# Patient Record
Sex: FEMALE | Race: WHITE | Employment: OTHER
[De-identification: names, ages, dates, MRNs, and addresses within clinical notes are randomized per-mention and may not be internally consistent; named-entity substitution may affect disease eponyms.]

---

## 2017-07-29 ENCOUNTER — HEALTH MAINTENANCE LETTER (OUTPATIENT)
Age: 32
End: 2017-07-29

## 2017-12-20 ENCOUNTER — RESULT FOLLOW UP (OUTPATIENT)
Dept: OBGYN | Facility: CLINIC | Age: 32
End: 2017-12-20

## 2017-12-20 ENCOUNTER — OFFICE VISIT (OUTPATIENT)
Dept: OBGYN | Facility: CLINIC | Age: 32
End: 2017-12-20
Payer: COMMERCIAL

## 2017-12-20 VITALS
DIASTOLIC BLOOD PRESSURE: 63 MMHG | WEIGHT: 136 LBS | SYSTOLIC BLOOD PRESSURE: 99 MMHG | TEMPERATURE: 96.8 F | HEART RATE: 65 BPM | HEIGHT: 64 IN | BODY MASS INDEX: 23.22 KG/M2

## 2017-12-20 DIAGNOSIS — R87.810 CERVICAL HIGH RISK HPV (HUMAN PAPILLOMAVIRUS) TEST POSITIVE: ICD-10-CM

## 2017-12-20 DIAGNOSIS — N87.1 MODERATE DYSPLASIA OF CERVIX (CIN II): ICD-10-CM

## 2017-12-20 DIAGNOSIS — Z01.419 ENCOUNTER FOR GYNECOLOGICAL EXAMINATION WITHOUT ABNORMAL FINDING: Primary | ICD-10-CM

## 2017-12-20 PROCEDURE — 99385 PREV VISIT NEW AGE 18-39: CPT | Performed by: NURSE PRACTITIONER

## 2017-12-20 PROCEDURE — 88175 CYTOPATH C/V AUTO FLUID REDO: CPT | Performed by: NURSE PRACTITIONER

## 2017-12-20 PROCEDURE — 87624 HPV HI-RISK TYP POOLED RSLT: CPT | Performed by: NURSE PRACTITIONER

## 2017-12-20 RX ORDER — VALACYCLOVIR HYDROCHLORIDE 500 MG/1
500 TABLET, FILM COATED ORAL
COMMUNITY
Start: 2017-05-05 | End: 2018-03-21

## 2017-12-20 ASSESSMENT — PAIN SCALES - GENERAL: PAINLEVEL: NO PAIN (0)

## 2017-12-20 NOTE — MR AVS SNAPSHOT
"              After Visit Summary   12/20/2017    Becky Moore    MRN: [id number]           Patient Information     Date Of Birth          1985        Visit Information        Provider Department      12/20/2017 9:10 AM Brittnee Mcmillan APRN CNP [facility]        Today's Diagnoses     Encounter for gynecological examination without abnormal finding    -  1    Moderate dysplasia of cervix (KATINA II)           Follow-ups after your visit        Who to contact     If you have questions or need follow up information about today's clinic visit or your schedule please contact [facility] directly at [phone].  Normal or non-critical lab and imaging results will be communicated to you by MyChart, letter or phone within 4 business days after the clinic has received the results. If you do not hear from us within 7 days, please contact the clinic through Hydrostorhart or phone. If you have a critical or abnormal lab result, we will notify you by phone as soon as possible.  Submit refill requests through Onstream Media or call your pharmacy and they will forward the refill request to us. Please allow 3 business days for your refill to be completed.          Additional Information About Your Visit        MyChart Information     On[employer] gives you secure access to your electronic health record. If you see a primary care provider, you can also send messages to your care team and make appointments. If you have questions, please call your primary care clinic.  If you do not have a primary care provider, please call [phone] and they will assist you.        Care EveryWhere ID     This is your Care EveryWhere ID. This could be used by other organizations to access your [place] medical records  [id number]        Your Vitals Were     Pulse Temperature Height Last Period BMI (Body Mass Index)       65 96.8  F (36  C) (Oral) 5' 3.75\" (1.619 m) 12/13/2017 (Exact Date) 23.53 kg/m2        Blood Pressure " from Last 3 Encounters:   12/20/17 99/63   02/05/15 97/65   05/09/14 104/62    Weight from Last 3 Encounters:   12/20/17 136 lb (61.7 kg)   02/05/15 126 lb (57.2 kg)   05/09/14 135 lb (61.2 kg)              We Performed the Following     HPV High Risk Types DNA Cervical     Pap imaged thin layer diagnostic with HPV (select HPV order below)        Primary Care Provider Office Phone # Fax #    [facility] [phone] [address] [facility] [address]        Equal Access to Services     HARLEY SARMIENTO : Hadii jaquan sheffield hadilzo Solenore, waaxda luqadaha, qaybta kaalmada aderyley, shine diehl . So [facility] [phone].    ATENCIÓN: Si habla español, tiene a brown disposición servicios gratuitos de asistencia lingüística. LlKettering Health Preble [phone].    We comply with applicable federal civil rights laws and [place] laws. We do not discriminate on the basis of race, color, national origin, age, disability, sex, sexual orientation, or gender identity.            Thank you!     Thank you for choosing [facility]  for your care. Our goal is always to provide you with excellent care. Hearing back from our patients is one way we can continue to improve our services. Please take a few minutes to complete the written survey that you may receive in the mail after your visit with us. Thank you!             Your Updated Medication List - Protect others around you: Learn how to safely use, store and throw away your medicines at www.disposemymeds.org.          This list is accurate as of: 12/20/17  9:43 AM.  Always use your most recent med list.                   Brand Name Dispense Instructions for use Diagnosis    Multi-vitamin Tabs tablet      Take 1 tablet by mouth daily        polyethylene glycol 0.4%- propylene glycol 0.3% 0.4-0.3 % Soln ophthalmic solution    SYSTANE ULTRA    1 Bottle    Place 1 drop into both eyes 2 times daily    Dry eyes, bilateral        valACYclovir 500 MG tablet    VALTREX     Take 500 mg by mouth

## 2017-12-20 NOTE — NURSING NOTE
"Chief Complaint   Patient presents with     Physical       Initial BP 99/63  Pulse 65  Temp 96.8  F (36  C) (Oral)  Ht 5' 3.75\" (1.619 m)  Wt 136 lb (61.7 kg)  LMP 12/13/2017 (Exact Date)  BMI 23.53 kg/m2 Estimated body mass index is 23.53 kg/(m^2) as calculated from the following:    Height as of this encounter: 5' 3.75\" (1.619 m).    Weight as of this encounter: 136 lb (61.7 kg)..  BP completed using cuff size: chela Mcduffie CMA    "

## 2017-12-20 NOTE — LETTER
January 31, 2019      Becky Moore  [address]  [place] [address]    Dear Teresa,      At [facility], your health and wellness is our primary concern. That is why we are following up on a positive high risk HPV test from 8/15/18. Your provider had recommended that you have a Pap smear and HPV test completed by 2/15/19. Our records do not show that this has been scheduled.    It is important to complete the follow up that your provider has suggested for you to ensure that there are no worsening changes which may, over time, develop into cancer.      Please contact our office at  [phone] to schedule an appointment for a Pap smear and HPV test at your earliest convenience. If you have questions or concerns, please call the clinic and we will be happy to assist you.    If you have completed the tests outside of [facility], please have the results forwarded to our office. We will update the chart for your primary Physician to review before your next annual physical.     Thank you for choosing [facility]!    Sincerely,      LUIS ARMANDO Ruiz CNP/rlm

## 2017-12-20 NOTE — LETTER
June 7, 2018      Becky Moore  [address] [place] [address] [address]  [facility] [address]    Dear MsRolandoTeresa,      At [facility], your health and wellness is our primary concern. That is why we are following up on a positive high risk HPV test from 12/20/17. Your provider had recommended that you have a Colposcopy completed. Our records do not show you have opted not to have the Colposcopy and instead come back for a Pap smear and HPV test by 6/20/18. We do not show that this has been scheduled.    It is important to complete the follow up that your provider has suggested for you to ensure that there are no worsening changes which may, over time, develop into cancer.      Please contact our office at  [phone] to schedule an appointment for a Pap smear and HPV test at your earliest convenience. If you have questions or concerns, please call the clinic and we will be happy to assist you.    If you have completed the tests outside of [facility], please have the results forwarded to our office. We will update the chart for your primary Physician to review before your next annual physical.     Thank you for choosing [facility]!    Sincerely,      LUIS ARMANDO Ruiz CNP/rlm

## 2017-12-20 NOTE — PROGRESS NOTES
SUBJECTIVE:   CC: Becky Moore is an 32 year old woman who presents for preventive health visit.     Physical   Annual:     Getting at least 3 servings of Calcium per day::  Yes    Bi-annual eye exam::  Yes    Dental care twice a year::  Yes    Sleep apnea or symptoms of sleep apnea::  None    Diet::  Low salt, Carbohydrate counting and Gluten-free/reduced    Frequency of exercise::  6-7 days/week    Duration of exercise::  45-60 minutes    Taking medications regularly::  Yes    Medication side effects::  None    Additional concerns today::  No              She has been seen at [facility] for the last few years.   Patient had Essure procedure and then due to adverse side effects, had bilateral salpingectomy in 2016.    Has been followed for abnormal pap smears/KATINA as follows:  5/2015: LSIL pap smear  6/2015: KATINA 2-3 on colposcopy  6/2015: Cryotherapy  12/2015: NIL and HR HPV negative  3/2017: ASCUS and HR HPV positive-colposcopy was recommended-patient has not yet done this.        Today's PHQ-2 Score:   PHQ-2 ( 1999 [employer]) 12/20/2017   Q1: Little interest or pleasure in doing things 0   Q2: Feeling down, depressed or hopeless 0   PHQ-2 Score 0   Q1: Little interest or pleasure in doing things Not at all   Q2: Feeling down, depressed or hopeless Not at all   PHQ-2 Score 0       Abuse: Current or Past(Physical, Sexual or Emotional) - NO  Do you feel safe in your environment - Yes    Social History   Substance Use Topics     Smoking status: Never Smoker     Smokeless tobacco: Never Used      Comment: Nonsmoking household     Alcohol use No      Comment: Occasionally     Alcohol Use 12/20/2017   If you drink alcohol, do you typically have greater than 3 drinks per day OR greater than 7 drinks per week?   No       Reviewed orders with patient.  Reviewed health maintenance and updated orders accordingly - Yes  Patient Active Problem List   Diagnosis     Depression     Borderline personality disorder      CARDIOVASCULAR SCREENING; LDL GOAL LESS THAN 160     Papanicolaou smear of cervix with low grade squamous intraepithelial lesion (LGSIL)     Major depression in complete remission (H)     Moderate major depression (H)     Generalized anxiety disorder     Panic attack     Nevus     Past Surgical History:   Procedure Laterality Date     CRYOCAUTERY OF CERVIX  06/2015     PE TUBES       SALPINGECTOMY      After Essure     TONSILLECTOMY         Social History   Substance Use Topics     Smoking status: Never Smoker     Smokeless tobacco: Never Used      Comment: Nonsmoking household     Alcohol use No      Comment: Occasionally     Family History   Problem Relation Age of Onset     Breast Cancer Maternal Grandmother      C.A.D. Maternal Grandmother      HEART DISEASE Maternal Grandmother      CANCER Maternal Grandmother      Hypertension Maternal Grandmother      Thyroid Disease Maternal Grandmother      CEREBROVASCULAR DISEASE Maternal Grandfather      DIABETES Maternal Grandfather      Hypertension Maternal Grandfather      HEART DISEASE Paternal Grandfather      Asthma Sister      Glaucoma No family hx of      Macular Degeneration No family hx of              Mammogram not appropriate for this patient based on age.    Pertinent mammograms are reviewed under the imaging tab.  History of abnormal Pap smear: YES - updated in Problem List and Health Maintenance accordingly-see above    Reviewed and updated as needed this visit by clinical staffTobacco  Allergies  Meds  Med Hx  Surg Hx  Fam Hx  Soc Hx        Reviewed and updated as needed this visit by Provider        Past Medical History:   Diagnosis Date     Arthritis      ASCUS with positive high risk HPV 8/2013    type 16     Biological false-positive (BFP) syphilis serology test 2/22/2010     Borderline personality disorder      KATINA III (cervical intraepithelial neoplasia III) 06/2015    KATINA 2-3     Depression      Endometriosis      History of colposcopy with  "cervical biopsy 10/6/10    Negative for dysplasia      Past Surgical History:   Procedure Laterality Date     CRYOCAUTERY OF CERVIX  06/2015     PE TUBES       SALPINGECTOMY      After Essure     TONSILLECTOMY         Review of Systems  C: NEGATIVE for fever, chills, change in weight  I: NEGATIVE for worrisome rashes, moles or lesions  E: NEGATIVE for vision changes or irritation  ENT: NEGATIVE for ear, mouth and throat problems  R: NEGATIVE for significant cough or SOB  B: NEGATIVE for masses, tenderness or discharge  CV: NEGATIVE for chest pain, palpitations or peripheral edema  GI: NEGATIVE for nausea, abdominal pain, heartburn, or change in bowel habits  : NEGATIVE for unusual urinary or vaginal symptoms. Periods are regular.  M: NEGATIVE for significant arthralgias or myalgia  N: NEGATIVE for weakness, dizziness or paresthesias  P: NEGATIVE for changes in mood or affect     OBJECTIVE:   BP 99/63  Pulse 65  Temp 96.8  F (36  C) (Oral)  Ht 5' 3.75\" (1.619 m)  Wt 136 lb (61.7 kg)  LMP 12/13/2017 (Exact Date)  BMI 23.53 kg/m2  Physical Exam  GENERAL: healthy, alert and no distress  EYES: Eyes grossly normal to inspection, PERRL and conjunctivae and sclerae normal  HENT: ear canals and TM's normal, nose and mouth without ulcers or lesions  NECK: no adenopathy, no asymmetry, masses, or scars and thyroid normal to palpation  RESP: lungs clear to auscultation - no rales, rhonchi or wheezes  BREAST: normal without masses, tenderness or nipple discharge and no palpable axillary masses or adenopathy  CV: regular rate and rhythm, normal S1 S2, no S3 or S4, no murmur, click or rub, no peripheral edema and peripheral pulses strong  ABDOMEN: soft, nontender, no hepatosplenomegaly, no masses and bowel sounds normal   (female): normal female external genitalia, normal urethral meatus, vaginal mucosa pink, moist, well rugated, and normal cervix/adnexa/uterus without masses or discharge  MS: no gross musculoskeletal " defects noted, no edema  SKIN: no suspicious lesions or rashes  NEURO: Normal strength and tone, mentation intact and speech normal  PSYCH: mentation appears normal, affect normal/bright    ASSESSMENT/PLAN:   1. Encounter for gynecological examination without abnormal finding  Discussed routine preventative care.    2. Moderate dysplasia of cervix (KATINA II)  Diagnostic pap smear and HPV test today. Follow up to be based on results. Pap history listed above.  - Pap imaged thin layer diagnostic with HPV (select HPV order below)  - HPV High Risk Types DNA Cervical    COUNSELING:  Reviewed preventive health counseling, as reflected in patient instructions  Special attention given to:        Regular exercise       Healthy diet/nutrition    Counseling Resources:  ATP IV Guidelines  Pooled Cohorts Equation Calculator  Breast Cancer Risk Calculator  FRAX Risk Assessment  ICSI Preventive Guidelines  Dietary Guidelines for Americans, 2010  USDA's MyPlate  ASA Prophylaxis  Lung CA Screening    LUIS ARMANDO Ruiz [facility] [place]  Answers for HPI/ROS submitted by the patient on 12/20/2017   PHQ-2 Score: 0

## 2017-12-22 LAB
COPATH REPORT: NORMAL
PAP: NORMAL

## 2017-12-27 LAB
FINAL DIAGNOSIS: ABNORMAL
HPV HR 12 DNA CVX QL NAA+PROBE: POSITIVE
HPV16 DNA SPEC QL NAA+PROBE: NEGATIVE
HPV18 DNA SPEC QL NAA+PROBE: NEGATIVE
SPECIMEN DESCRIPTION: ABNORMAL

## 2017-12-27 NOTE — PROGRESS NOTES
10/7/09:Pap--ASCUS, + high risk HPV 70.   6/10/10:Pap--LSIL, can't exclude HSIL.   10/6/10:[place]--Negative for dysplasia.   11/1/11:Pap--NIL. + high risk HPV 16. Repeat pap 6 months.   4/23/12:Pap--NIL. (done at [facility])  12/7/12:Pap--ASC-H. (done at [facility])  12/13/12:[place]--negative (done at [facility])   5/29/13: Patient non-compliant/declines recommended follow-up. Pap tracking file closed.   8/13/13:Pap - ASCUS, + HR HPV 16. Repeat pap and HPV in 6 months.    4/8/2014 NIL Pap (Care Everywhere)  5/27/15 LSIL Pap, can't exclude HSIL Pap (Care Everywhere)  6/8/15 [place] - KATINA 2-3. (Care Everywhere)  6/2015 - Cryotherapy (per visit note documentation)  12/7/15 NIL Pap (Care Everywhere)  3/7/17 ASCUS Pap, + HR HPV (Care Everywhere).Colposcopy was recommended-patient did not do this.  12/20/17 NIL Pap, + HR HPV (Not types 16/18). Plan colp  12/27/17 Message left to return call.   12/29/17 2nd message left to return call (ajk) Pt notified (ajk)  1/2/18 Pt declines colposcopy. Plan cotest in 6 months per provider.   6/7/18 [facility] reminder letter sent (rlm)  8/15/18 NIL Pap, + HR HPV (Neg 16/18). Pt declines a colposcopy. Plan cotest in 6 months.   8/21/18 Pt notified by phone.   1/31/19 [facility] reminder letter sent (rlm)  2/21/19 Pap Follow up reminder call placed, voicemail left (rlm)  2/22/19 Appt - No Show (rlm)  03/25/19 Patient is lost to pap tracking follow-up. FYI routed to provider. (jese)

## 2017-12-29 ENCOUNTER — TELEPHONE (OUTPATIENT)
Dept: OBGYN | Facility: CLINIC | Age: 32
End: 2017-12-29

## 2017-12-29 NOTE — TELEPHONE ENCOUNTER
"Spoke with patient- patient states \"I don't think I am going to schedule\".  Patient states \"I've had too many of them and I'm just over them\".  Patient does not wish to schedule.  Informed patient I would notify pap tracking.  Patient said \"thanks\" and ended call.    "

## 2017-12-29 NOTE — TELEPHONE ENCOUNTER
Notes Recorded by Gayathri Watson on 12/29/2017 at 11:28 AM  Routing to  OB/GYN patient care pool to contact pt to assist in scheduling colp. Thanks!    Pt called back. I informed the patient of her result and the recommended f/u of a colposcopy. Pt advised to take 400mg Ibuprofen 1 hour before procedure. Told pt nothing vaginally 24 hours before the colposcopy and to try to schedule the colposcopy when she will not be expecting a period. I answered the patient's questions. The patient verbalizes understanding and agrees with the plan. She will await call from her Care Team at the [facility].    JB GuanN, RN - Pap Tracking

## 2018-01-02 NOTE — TELEPHONE ENCOUNTER
If she will not do colposcopy, given her history I would recommend we repeat co-test in 6 months to keep close eye on her. Thank you. Brittnee DURÁN CNP

## 2018-01-02 NOTE — TELEPHONE ENCOUNTER
Forwarding to previous encounter provider, LUIS ARMANDO Frost, CNP -   This is the second colp recommendation pt has declined this year.  Please advised on how to proceed.    Pap hx:   10/7/09:Pap--ASCUS, + high risk HPV 70.   6/10/10:Pap--LSIL, can't exclude HSIL.   10/6/10:[place]--Negative for dysplasia.   11/1/11:Pap--NIL. + high risk HPV 16. Repeat pap 6 months.   4/23/12:Pap--NIL. (done at [facility])  12/7/12:Pap--ASC-H. (done at [facility])  12/13/12:[place]--negative (done at [facility])  5/29/13: Patient non-compliant/declines recommended follow-up. Pap tracking file closed.   8/13/13:Pap - ASCUS, + HR HPV 16. Repeat pap and HPV in 6 months.    4/8/2014 NIL Pap (Care Everywhere)  5/27/15 LSIL Pap, can't exclude HSIL Pap (Care Everywhere)  6/8/15 [place] - KATINA 2-3. (Care Everywhere)  6/2015 - Cryotherapy (per visit note documentation)  12/7/15 NIL Pap (Care Everywhere)  3/7/17 ASCUS Pap, + HR HPV (Care Everywhere).Colposcopy was recommended-patient did not do this.  12/20/17 NIL Pap, + HR HPV (Not types 16/18). Plan colp - pt declines    Thanks!  GERA Guan, RN - Pap Tracking

## 2018-03-20 ENCOUNTER — TELEPHONE (OUTPATIENT)
Dept: OBGYN | Facility: CLINIC | Age: 33
End: 2018-03-20

## 2018-03-20 DIAGNOSIS — B00.9 HERPES SIMPLEX VIRUS INFECTION: Primary | ICD-10-CM

## 2018-03-21 NOTE — TELEPHONE ENCOUNTER
Patient is wondering if you would refill her Valtrex.  Patient has gotten refills from [facility].  Marya won't refill it anymore because she now sees Brittnee.  Patient takes this every day for genital herpes.  Leah Michelle, JOSUE  March 21, 2018 5:18 PM

## 2018-03-22 RX ORDER — VALACYCLOVIR HYDROCHLORIDE 1 G/1
1000 TABLET, FILM COATED ORAL DAILY
Qty: 90 TABLET | Refills: 2 | Status: SHIPPED | OUTPATIENT
Start: 2018-03-22 | End: 2019-01-29

## 2018-03-26 ENCOUNTER — OFFICE VISIT (OUTPATIENT)
Dept: OBGYN | Facility: CLINIC | Age: 33
End: 2018-03-26
Payer: COMMERCIAL

## 2018-03-26 VITALS
HEART RATE: 86 BPM | DIASTOLIC BLOOD PRESSURE: 65 MMHG | OXYGEN SATURATION: 97 % | WEIGHT: 142.8 LBS | TEMPERATURE: 98.5 F | HEIGHT: 64 IN | BODY MASS INDEX: 24.38 KG/M2 | SYSTOLIC BLOOD PRESSURE: 113 MMHG

## 2018-03-26 DIAGNOSIS — Z11.3 SCREEN FOR STD (SEXUALLY TRANSMITTED DISEASE): Primary | ICD-10-CM

## 2018-03-26 DIAGNOSIS — A54.9 GONORRHEA: ICD-10-CM

## 2018-03-26 PROCEDURE — 87389 HIV-1 AG W/HIV-1&-2 AB AG IA: CPT | Performed by: NURSE PRACTITIONER

## 2018-03-26 PROCEDURE — 36415 COLL VENOUS BLD VENIPUNCTURE: CPT | Performed by: NURSE PRACTITIONER

## 2018-03-26 PROCEDURE — 87491 CHLMYD TRACH DNA AMP PROBE: CPT | Performed by: NURSE PRACTITIONER

## 2018-03-26 PROCEDURE — 87340 HEPATITIS B SURFACE AG IA: CPT | Performed by: NURSE PRACTITIONER

## 2018-03-26 PROCEDURE — 87591 N.GONORRHOEAE DNA AMP PROB: CPT | Performed by: NURSE PRACTITIONER

## 2018-03-26 PROCEDURE — 86803 HEPATITIS C AB TEST: CPT | Performed by: NURSE PRACTITIONER

## 2018-03-26 PROCEDURE — 99213 OFFICE O/P EST LOW 20 MIN: CPT | Performed by: NURSE PRACTITIONER

## 2018-03-26 PROCEDURE — 86780 TREPONEMA PALLIDUM: CPT | Performed by: NURSE PRACTITIONER

## 2018-03-26 ASSESSMENT — PAIN SCALES - GENERAL: PAINLEVEL: NO PAIN (0)

## 2018-03-26 NOTE — MR AVS SNAPSHOT
"              After Visit Summary   3/26/2018    Becky Moore    MRN: [id number]           Patient Information     Date Of Birth          1985        Visit Information        Provider Department      3/26/2018 2:30 PM Brittnee Mcmillan APRN CNP [facility]        Today's Diagnoses     Screen for STD (sexually transmitted disease)    -  1       Follow-ups after your visit        Who to contact     If you have questions or need follow up information about today's clinic visit or your schedule please contact [facility] directly at [phone].  Normal or non-critical lab and imaging results will be communicated to you by VILOOPhart, letter or phone within 4 business days after the clinic has received the results. If you do not hear from us within 7 days, please contact the clinic through VILOOPhart or phone. If you have a critical or abnormal lab result, we will notify you by phone as soon as possible.  Submit refill requests through Tunespeak or call your pharmacy and they will forward the refill request to us. Please allow 3 business days for your refill to be completed.          Additional Information About Your Visit        MyChart Information     Tunespeak gives you secure access to your electronic health record. If you see a primary care provider, you can also send messages to your care team and make appointments. If you have questions, please call your primary care clinic.  If you do not have a primary care provider, please call [phone] and they will assist you.        Care EveryWhere ID     This is your Care EveryWhere ID. This could be used by other organizations to access your [facility] medical records  [id number]        Your Vitals Were     Pulse Temperature Height Last Period Pulse Oximetry BMI (Body Mass Index)    86 98.5  F (36.9  C) (Oral) 5' 3.75\" (1.619 m) 02/26/2018 (Approximate) 97% 24.7 kg/m2       Blood Pressure from Last 3 Encounters:   03/26/18 113/65 "   12/20/17 99/63   02/05/15 97/65    Weight from Last 3 Encounters:   03/26/18 142 lb 12.8 oz (64.8 kg)   12/20/17 136 lb (61.7 kg)   02/05/15 126 lb (57.2 kg)              We Performed the Following     Anti Treponema     Chlamydia trachomatis PCR     Hepatitis B surface antigen     Hepatitis C antibody     HIV Antigen Antibody Combo     Neisseria gonorrhoeae PCR          Today's Medication Changes          These changes are accurate as of 3/26/18  2:48 PM.  If you have any questions, ask your nurse or doctor.               Stop taking these medicines if you haven't already. Please contact your care team if you have questions.     polyethylene glycol 0.4%- propylene glycol 0.3% 0.4-0.3 % Soln ophthalmic solution   Commonly known as:  SYSTANE ULTRA   Stopped by:  Brittnee Mcmillan APRN CNP                    Primary Care Provider Office Phone # Fax #    [facility] [phone] [address] [facility] [address]        Equal Access to Services     [facility] : Hadii aad ku hadasho Soomaali, waaxda luqadaha, qaybta kaalmada adeegyada, waxmalathi barrios hayyareli diehl . So [facility] [phone].    ATENCIÓN: Si habla español, tiene a brown disposición servicios gratuitos de asistencia lingüística. LlSelect [facility] Specialty [facility] [phone].    We comply with applicable federal civil rights laws and [place] laws. We do not discriminate on the basis of race, color, national origin, age, disability, sex, sexual orientation, or gender identity.            Thank you!     Thank you for choosing [facility]  for your care. Our goal is always to provide you with excellent care. Hearing back from our patients is one way we can continue to improve our services. Please take a few minutes to complete the written survey that you may receive in the mail after your visit with us. Thank you!             Your Updated Medication List - Protect others around you: Learn how to safely use, store and  throw away your medicines at www.disposemymeds.org.          This list is accurate as of 3/26/18  2:48 PM.  Always use your most recent med list.                   Brand Name Dispense Instructions for use Diagnosis    Multi-vitamin Tabs tablet      Take 1 tablet by mouth daily        valACYclovir 1000 mg tablet    VALTREX    90 tablet    Take 1 tablet (1,000 mg) by mouth daily    Herpes simplex virus infection

## 2018-03-26 NOTE — NURSING NOTE
"Chief Complaint   Patient presents with     STD     Testing       Initial /65  Pulse 86  Temp 98.5  F (36.9  C) (Oral)  Ht 5' 3.75\" (1.619 m)  Wt 142 lb 12.8 oz (64.8 kg)  LMP 02/26/2018 (Approximate)  SpO2 97%  BMI 24.7 kg/m2 Estimated body mass index is 24.7 kg/(m^2) as calculated from the following:    Height as of this encounter: 5' 3.75\" (1.619 m).    Weight as of this encounter: 142 lb 12.8 oz (64.8 kg)..  BP completed using cuff size: chela Mcduffie CMA    "

## 2018-03-26 NOTE — PROGRESS NOTES
SUBJECTIVE:   Becky Moore is a 33 year old female who presents to clinic today for the following health issues:    STD Testing    Denies any specific concerns or known exposure. No new partner. Denies abnormal vaginal discharge, itching, odor, pelvic pain or fevers. Patient did have an abnormal pap smear in December and colposcopy was recommended, but she is declining to schedule, so plans repeat pap smear in June. No other concerns today.    Problem list and histories reviewed & adjusted, as indicated.  Additional history: as documented    Patient Active Problem List   Diagnosis     Depression     Borderline personality disorder     CARDIOVASCULAR SCREENING; LDL GOAL LESS THAN 160     Cervical high risk HPV (human papillomavirus) test positive     Major depression in complete remission (H)     Moderate major depression (H)     Generalized anxiety disorder     Panic attack     Nevus     Past Surgical History:   Procedure Laterality Date     CRYOCAUTERY OF CERVIX  06/2015     PE TUBES       SALPINGECTOMY      After Essure     TONSILLECTOMY         Social History   Substance Use Topics     Smoking status: Never Smoker     Smokeless tobacco: Never Used      Comment: Nonsmoking household     Alcohol use No      Comment: Occasionally     Family History   Problem Relation Age of Onset     Breast Cancer Maternal Grandmother      C.A.D. Maternal Grandmother      HEART DISEASE Maternal Grandmother      CANCER Maternal Grandmother      Hypertension Maternal Grandmother      Thyroid Disease Maternal Grandmother      CEREBROVASCULAR DISEASE Maternal Grandfather      DIABETES Maternal Grandfather      Hypertension Maternal Grandfather      HEART DISEASE Paternal Grandfather      Asthma Sister      Glaucoma No family hx of      Macular Degeneration No family hx of            Reviewed and updated as needed this visit by clinical staff       Reviewed and updated as needed this visit by Provider         ROS:  Constitutional,  "HEENT, cardiovascular, pulmonary, gi and gu systems are negative, except as otherwise noted.    OBJECTIVE:     /65  Pulse 86  Temp 98.5  F (36.9  C) (Oral)  Ht 5' 3.75\" (1.619 m)  Wt 142 lb 12.8 oz (64.8 kg)  LMP 02/26/2018 (Approximate)  SpO2 97%  BMI 24.7 kg/m2  Body mass index is 24.7 kg/(m^2).  GENERAL: healthy, alert and no distress  RESP: lungs clear to auscultation - no rales, rhonchi or wheezes  CV: regular rate and rhythm, normal S1 S2, no S3 or S4, no murmur, click or rub, no peripheral edema and peripheral pulses strong  ABDOMEN: soft, nontender, no hepatosplenomegaly, no masses and bowel sounds normal   (female): normal female external genitalia, normal urethral meatus, vaginal mucosa, normal cervix/adnexa/uterus without masses or discharge  MS: no gross musculoskeletal defects noted, no edema      ASSESSMENT/PLAN:   1. Screen for STD (sexually transmitted disease)  Labs done per request. Encouraged safe sexual practices. Strongly encouraged patient to return to clinic in June for repeat pap smear.   - Chlamydia trachomatis PCR  - Neisseria gonorrhoeae PCR  - Hepatitis B surface antigen  - Hepatitis C antibody  - Anti Treponema  - HIV Antigen Antibody Combo    LUIS ARMANDO Ruiz [facility]  "

## 2018-03-27 ENCOUNTER — ALLIED HEALTH/NURSE VISIT (OUTPATIENT)
Dept: NURSING | Facility: CLINIC | Age: 33
End: 2018-03-27
Payer: COMMERCIAL

## 2018-03-27 DIAGNOSIS — A54.9 GONORRHEA IN FEMALE: Primary | ICD-10-CM

## 2018-03-27 LAB
C TRACH DNA SPEC QL NAA+PROBE: NEGATIVE
HBV SURFACE AG SERPL QL IA: NONREACTIVE
HCV AB SERPL QL IA: NONREACTIVE
HIV 1+2 AB+HIV1 P24 AG SERPL QL IA: NONREACTIVE
N GONORRHOEA DNA SPEC QL NAA+PROBE: POSITIVE
SPECIMEN SOURCE: ABNORMAL
SPECIMEN SOURCE: NORMAL
T PALLIDUM IGG+IGM SER QL: NEGATIVE

## 2018-03-27 PROCEDURE — 96372 THER/PROPH/DIAG INJ SC/IM: CPT

## 2018-03-27 PROCEDURE — 99207 ZZC NO CHARGE NURSE ONLY: CPT

## 2018-03-27 RX ORDER — AZITHROMYCIN 500 MG/1
1000 TABLET, FILM COATED ORAL ONCE
Qty: 2 TABLET | Refills: 0 | Status: SHIPPED | OUTPATIENT
Start: 2018-03-27 | End: 2018-03-27

## 2018-03-27 RX ORDER — CEFTRIAXONE SODIUM 250 MG/1
250 INJECTION, POWDER, FOR SOLUTION INTRAMUSCULAR; INTRAVENOUS ONCE
Qty: 1.25 ML | Refills: 0 | OUTPATIENT
Start: 2018-03-27 | End: 2018-03-27

## 2018-03-27 NOTE — MR AVS SNAPSHOT
After Visit Summary   3/27/2018    Becky Moore    MRN: [id number]           Patient Information     Date Of Birth          1985        Visit Information        Provider Department      3/27/2018 3:15 PM AN ANCILLARY [facility]        Today's Diagnoses     Gonorrhea in female    -  1       Follow-ups after your visit        Who to contact     If you have questions or need follow up information about today's clinic visit or your schedule please contact [facility] directly at [phone].  Normal or non-critical lab and imaging results will be communicated to you by MyChart, letter or phone within 4 business days after the clinic has received the results. If you do not hear from us within 7 days, please contact the clinic through miihart or phone. If you have a critical or abnormal lab result, we will notify you by phone as soon as possible.  Submit refill requests through [employer] or call your pharmacy and they will forward the refill request to us. Please allow 3 business days for your refill to be completed.          Additional Information About Your Visit        MyChart Information     [employer] gives you secure access to your electronic health record. If you see a primary care provider, you can also send messages to your care team and make appointments. If you have questions, please call your primary care clinic.  If you do not have a primary care provider, please call [phone] and they will assist you.        Care EveryWhere ID     This is your Care EveryWhere ID. This could be used by other organizations to access your [place] medical records  [id number]        Your Vitals Were     Last Period                   02/26/2018 (Approximate)            Blood Pressure from Last 3 Encounters:   03/26/18 113/65   12/20/17 99/63   02/05/15 97/65    Weight from Last 3 Encounters:   03/26/18 142 lb 12.8 oz (64.8 kg)   12/20/17 136 lb (61.7 kg)   02/05/15 126 lb (57.2  kg)              We Performed the Following     INJECTION INTRAMUSCULAR OR SUB-Q     ROCEPHIN 250 MG VIAL        Primary Care Provider Office Phone # Fax #    [facility] [phone] [address] [facility] [address]        Equal Access to Services     FERMIN SARMIENTO : Hadii aad ku hadlizo Soomaali, waaxda luqadaha, qaybta kaalmada adeegyada, waxay idiin haysandyn adeeg cornelia shanita mcclendon. So [facility] [phone].    ATENCIÓN: Si habla español, tiene a brown disposición servicios gratuitos de asistencia lingüística. Llame al [phone].    We comply with applicable federal civil rights laws and [place] laws. We do not discriminate on the basis of race, color, national origin, age, disability, sex, sexual orientation, or gender identity.            Thank you!     Thank you for choosing [facility]  for your care. Our goal is always to provide you with excellent care. Hearing back from our patients is one way we can continue to improve our services. Please take a few minutes to complete the written survey that you may receive in the mail after your visit with us. Thank you!             Your Updated Medication List - Protect others around you: Learn how to safely use, store and throw away your medicines at www.disposemymeds.org.          This list is accurate as of 3/27/18  3:28 PM.  Always use your most recent med list.                   Brand Name Dispense Instructions for use Diagnosis    azithromycin 500 MG tablet    ZITHROMAX    2 tablet    Take 2 tablets (1,000 mg) by mouth once for 1 dose    Gonorrhea       cefTRIAXone 250 MG injection    ROCEPHIN    1.25 mL    Inject 250 mg into the muscle once for 1 dose    Gonorrhea       Multi-vitamin Tabs tablet      Take 1 tablet by mouth daily        valACYclovir 1000 mg tablet    VALTREX    90 tablet    Take 1 tablet (1,000 mg) by mouth daily    Herpes simplex virus infection

## 2018-03-27 NOTE — PROGRESS NOTES
The following medication was given:     MEDICATION: Rocephin 250mg and Lidocaine 0.9cc  ROUTE: IM  SITE: Ventrogluteal - Right  DOSE: 250mg  LOT #: [id number]  Ceftriaxone 250mg    [id number] lidocaine 1%  :  [facility]  EXPIRATION DATE:  7/1/2020 8/2019  NDC#: 2168-6945-28                                [id number]  Rhonda Jefferson MA

## 2018-03-30 ENCOUNTER — TELEPHONE (OUTPATIENT)
Dept: OBGYN | Facility: CLINIC | Age: 33
End: 2018-03-30

## 2018-03-30 NOTE — TELEPHONE ENCOUNTER
----- Message from Albin Sadler sent at 3/30/2018  9:26 AM CDT -----  Regarding: MD reportable disease  This patient was positive for Gonorrhea on 3/26/18. Please report this to MD if it has not already been completed. Thank you.

## 2018-03-30 NOTE — TELEPHONE ENCOUNTER
Notes Recorded by Brittnee Mcmillan APRN CNP on 3/27/2018 at 1:30 PM  Telephone call to patient and discussed lab result. Will empirically treat for Chlamydia and prescription is sent to pharmacy. Discussed need for injection in clinic for treatment of gonorrhea. Rocephin ordered, patient's questions answered and she is transferred to schedule nurse appointment for injection.    Please complete [facility] form. Brittnee DURÁN CNP              Completed form and faxed to [facility], confirmed fax.  Kendal Ochoa RN

## 2018-05-09 NOTE — PROGRESS NOTES
SUBJECTIVE:   Becky Moore is a 33 year old female who presents to clinic today for the following health issues:    Test of cure for gonorrhea     Patient was seen at the end of March and STI screening was positive for gonorrhea. She was treated with Rocephin and also Azithromycin presumptively for Chlamydia. No concerning symptoms.     Problem list and histories reviewed & adjusted, as indicated.  Additional history: as documented    Patient Active Problem List   Diagnosis     Depression     Borderline personality disorder     CARDIOVASCULAR SCREENING; LDL GOAL LESS THAN 160     Cervical high risk HPV (human papillomavirus) test positive     Major depression in complete remission (H)     Moderate major depression (H)     Generalized anxiety disorder     Panic attack     Nevus     Past Surgical History:   Procedure Laterality Date     CRYOCAUTERY OF CERVIX  06/2015     PE TUBES       SALPINGECTOMY      After Essure     TONSILLECTOMY         Social History   Substance Use Topics     Smoking status: Never Smoker     Smokeless tobacco: Never Used      Comment: Nonsmoking household     Alcohol use No      Comment: Occasionally     Family History   Problem Relation Age of Onset     Breast Cancer Maternal Grandmother      C.A.D. Maternal Grandmother      HEART DISEASE Maternal Grandmother      CANCER Maternal Grandmother      Hypertension Maternal Grandmother      Thyroid Disease Maternal Grandmother      CEREBROVASCULAR DISEASE Maternal Grandfather      DIABETES Maternal Grandfather      Hypertension Maternal Grandfather      HEART DISEASE Paternal Grandfather      Asthma Sister      Glaucoma No family hx of      Macular Degeneration No family hx of            Reviewed and updated as needed this visit by clinical staff       Reviewed and updated as needed this visit by Provider         ROS:  Constitutional, HEENT, cardiovascular, pulmonary, gi and gu systems are negative, except as otherwise noted.    OBJECTIVE:  "    /66  Pulse 74  Temp 98.1  F (36.7  C) (Oral)  Ht 5' 3.75\" (1.619 m)  Wt 142 lb 3.2 oz (64.5 kg)  LMP 04/26/2018 (Approximate)  SpO2 96%  BMI 24.6 kg/m2  Body mass index is 24.6 kg/(m^2).  GENERAL: healthy, alert and no distress  ABDOMEN: soft, nontender, no hepatosplenomegaly, no masses and bowel sounds normal  MS: no gross musculoskeletal defects noted, no edema  PSYCH: mentation appears normal, affect normal/bright    ASSESSMENT/PLAN:   1. Gonorrhea in female  Will notify patient of results when available.  - Neisseria gonorrhoeae PCR    2. Screen for STD (sexually transmitted disease)  Patient requested repeat screen today.  - Chlamydia trachomatis PCR    LUIS ARMANDO Ruiz [facility]  "

## 2018-05-10 ENCOUNTER — OFFICE VISIT (OUTPATIENT)
Dept: OBGYN | Facility: CLINIC | Age: 33
End: 2018-05-10
Payer: COMMERCIAL

## 2018-05-10 VITALS
BODY MASS INDEX: 24.28 KG/M2 | HEIGHT: 64 IN | TEMPERATURE: 98.1 F | DIASTOLIC BLOOD PRESSURE: 66 MMHG | HEART RATE: 74 BPM | WEIGHT: 142.2 LBS | SYSTOLIC BLOOD PRESSURE: 119 MMHG | OXYGEN SATURATION: 96 %

## 2018-05-10 DIAGNOSIS — Z11.3 SCREEN FOR STD (SEXUALLY TRANSMITTED DISEASE): ICD-10-CM

## 2018-05-10 DIAGNOSIS — A54.9 GONORRHEA IN FEMALE: Primary | ICD-10-CM

## 2018-05-10 PROCEDURE — 87591 N.GONORRHOEAE DNA AMP PROB: CPT | Performed by: NURSE PRACTITIONER

## 2018-05-10 PROCEDURE — 99213 OFFICE O/P EST LOW 20 MIN: CPT | Performed by: NURSE PRACTITIONER

## 2018-05-10 PROCEDURE — 87491 CHLMYD TRACH DNA AMP PROBE: CPT | Performed by: NURSE PRACTITIONER

## 2018-05-10 ASSESSMENT — PAIN SCALES - GENERAL: PAINLEVEL: NO PAIN (0)

## 2018-05-10 NOTE — LETTER
My Depression Action Plan  Name: Becky Moore   Date of Birth 1985  Date: 5/9/2018    My doctor: [facility]   My clinic: [facility]  [address] [facility] [address] [phone]          GREEN    ZONE   Good Control    What it looks like:     Things are going generally well. You have normal up s and down s. You may even feel depressed from time to time, but bad moods usually last less than a day.   What you need to do:  1. Continue to care for yourself (see self care plan)  2. Check your depression survival kit and update it as needed  3. Follow your physician s recommendations including any medication.  4. Do not stop taking medication unless you consult with your physician first.           YELLOW         ZONE Getting Worse    What it looks like:     Depression is starting to interfere with your life.     It may be hard to get out of bed; you may be starting to isolate yourself from others.    Symptoms of depression are starting to last most all day and this has happened for several days.     You may have suicidal thoughts but they are not constant.   What you need to do:     1. Call your care team, your response to treatment will improve if you keep your care team informed of your progress. Yellow periods are signs an adjustment may need to be made.     2. Continue your self-care, even if you have to fake it!    3. Talk to someone in your support network    4. Open up your depression survival kit           RED    ZONE Medical Alert - Get Help    What it looks like:     Depression is seriously interfering with your life.     You may experience these or other symptoms: You can t get out of bed most days, can t work or engage in other necessary activities, you have trouble taking care of basic hygiene, or basic responsibilities, thoughts of suicide or death that will not go away, self-injurious behavior.     What you need to do:  1. Call your care team  and request a same-day appointment. If they are not available (weekends or after hours) call your local crisis line, emergency room or 911.            Depression Self Care Plan / Survival Kit    Self-Care for Depression  Here s the deal. Your body and mind are really not as separate as most people think.  What you do and think affects how you feel and how you feel influences what you do and think. This means if you do things that people who feel good do, it will help you feel better.  Sometimes this is all it takes.  There is also a place for medication and therapy depending on how severe your depression is, so be sure to consult with your medical provider and/ or Behavioral Health Consultant if your symptoms are worsening or not improving.     In order to better manage my stress, I will:    Exercise  Get some form of exercise, every day. This will help reduce pain and release endorphins, the  feel good  chemicals in your brain. This is almost as good as taking antidepressants!  This is not the same as joining a gym and then never going! (they count on that by the way ) It can be as simple as just going for a walk or doing some gardening, anything that will get you moving.      Hygiene   Maintain good hygiene (Get out of bed in the morning, Make your bed, Brush your teeth, Take a shower, and Get dressed like you were going to work, even if you are unemployed).  If your clothes don't fit try to get ones that do.    Diet  I will strive to eat foods that are good for me, drink plenty of water, and avoid excessive sugar, caffeine, alcohol, and other mood-altering substances.  Some foods that are helpful in depression are: complex carbohydrates, B vitamins, flaxseed, fish or fish oil, fresh fruits and vegetables.    Psychotherapy  I agree to participate in Individual Therapy (if recommended).    Medication  If prescribed medications, I agree to take them.  Missing doses can result in serious side effects.  I understand  that drinking alcohol, or other illicit drug use, may cause potential side effects.  I will not stop my medication abruptly without first discussing it with my provider.    Staying Connected With Others  I will stay in touch with my friends, family members, and my primary care provider/team.    Use your imagination  Be creative.  We all have a creative side; it doesn t matter if it s oil painting, sand castles, or mud pies! This will also kick up the endorphins.    Witness Beauty  (AKA stop and smell the roses) Take a look outside, even in mid-winter. Notice colors, textures. Watch the squirrels and birds.     Service to others  Be of service to others.  There is always someone else in need.  By helping others we can  get out of ourselves  and remember the really important things.  This also provides opportunities for practicing all the other parts of the program.    Humor  Laugh and be silly!  Adjust your TV habits for less news and crime-drama and more comedy.    Control your stress  Try breathing deep, massage therapy, biofeedback, and meditation. Find time to relax each day.     My support system    Clinic Contact:  Phone number:    Contact 1:  Phone number:    Contact 2:  Phone number:    Yazidism/:  Phone number:    Therapist:  Phone number:    Local crisis center:    Phone number:    Other community support:  Phone number:

## 2018-05-10 NOTE — NURSING NOTE
"Chief Complaint   Patient presents with     STD     Testing       Initial /66  Pulse 74  Temp 98.1  F (36.7  C) (Oral)  Ht 5' 3.75\" (1.619 m)  Wt 142 lb 3.2 oz (64.5 kg)  LMP 04/26/2018 (Approximate)  SpO2 96%  BMI 24.6 kg/m2 Estimated body mass index is 24.6 kg/(m^2) as calculated from the following:    Height as of this encounter: 5' 3.75\" (1.619 m).    Weight as of this encounter: 142 lb 3.2 oz (64.5 kg)..  BP completed using cuff size: chela Mcduffie CMA    "

## 2018-05-10 NOTE — MR AVS SNAPSHOT
"              After Visit Summary   5/10/2018    Becky Moore    MRN: [id number]           Patient Information     Date Of Birth          1985        Visit Information        Provider Department      5/10/2018 8:10 AM Brittnee Mcmillan APRN CNP [facility]        Today's Diagnoses     Gonorrhea in female    -  1    Screen for STD (sexually transmitted disease)           Follow-ups after your visit        Who to contact     If you have questions or need follow up information about today's clinic visit or your schedule please contact [facility] directly at [phone].  Normal or non-critical lab and imaging results will be communicated to you by MyChart, letter or phone within 4 business days after the clinic has received the results. If you do not hear from us within 7 days, please contact the clinic through Videoflothart or phone. If you have a critical or abnormal lab result, we will notify you by phone as soon as possible.  Submit refill requests through [employer] or call your pharmacy and they will forward the refill request to us. Please allow 3 business days for your refill to be completed.          Additional Information About Your Visit        MyChart Information     [employer] gives you secure access to your electronic health record. If you see a primary care provider, you can also send messages to your care team and make appointments. If you have questions, please call your primary care clinic.  If you do not have a primary care provider, please call [phone] and they will assist you.        Care EveryWhere ID     This is your Care EveryWhere ID. This could be used by other organizations to access your [place] medical records  [id number]        Your Vitals Were     Pulse Temperature Height Last Period Pulse Oximetry BMI (Body Mass Index)    74 98.1  F (36.7  C) (Oral) 5' 3.75\" (1.619 m) 04/26/2018 (Approximate) 96% 24.6 kg/m2       Blood Pressure from Last 3 " Encounters:   05/10/18 119/66   03/26/18 113/65   12/20/17 99/63    Weight from Last 3 Encounters:   05/10/18 142 lb 3.2 oz (64.5 kg)   03/26/18 142 lb 12.8 oz (64.8 kg)   12/20/17 136 lb (61.7 kg)              We Performed the Following     Chlamydia trachomatis PCR     Neisseria gonorrhoeae PCR        Primary Care Provider Office Phone # Fax #    [facility] [phone] [address] [facility] [address]        Equal Access to Services     [facility]: Hadii jaquan ku hadasho Soomaali, waaxda luqadaha, qaybta kaalmada aderyley, shine diehl . So [facility] [phone].    ATENCIÓN: Si habla español, tiene a brown disposición servicios gratuitos de asistencia lingüística. Ll[facility] [phone].    We comply with applicable federal civil rights laws and [place] laws. We do not discriminate on the basis of race, color, national origin, age, disability, sex, sexual orientation, or gender identity.            Thank you!     Thank you for choosing [facility]  for your care. Our goal is always to provide you with excellent care. Hearing back from our patients is one way we can continue to improve our services. Please take a few minutes to complete the written survey that you may receive in the mail after your visit with us. Thank you!             Your Updated Medication List - Protect others around you: Learn how to safely use, store and throw away your medicines at www.disposemymeds.org.          This list is accurate as of 5/10/18  8:38 AM.  Always use your most recent med list.                   Brand Name Dispense Instructions for use Diagnosis    Multi-vitamin Tabs tablet      Take 1 tablet by mouth daily        valACYclovir 1000 mg tablet    VALTREX    90 tablet    Take 1 tablet (1,000 mg) by mouth daily    Herpes simplex virus infection

## 2018-05-11 LAB
C TRACH DNA SPEC QL NAA+PROBE: NEGATIVE
N GONORRHOEA DNA SPEC QL NAA+PROBE: NEGATIVE
SPECIMEN SOURCE: NORMAL
SPECIMEN SOURCE: NORMAL

## 2018-08-15 ENCOUNTER — OFFICE VISIT (OUTPATIENT)
Dept: OBGYN | Facility: CLINIC | Age: 33
End: 2018-08-15
Payer: COMMERCIAL

## 2018-08-15 VITALS
WEIGHT: 144.8 LBS | TEMPERATURE: 99.2 F | HEART RATE: 90 BPM | HEIGHT: 64 IN | OXYGEN SATURATION: 97 % | DIASTOLIC BLOOD PRESSURE: 73 MMHG | SYSTOLIC BLOOD PRESSURE: 120 MMHG | BODY MASS INDEX: 24.72 KG/M2

## 2018-08-15 DIAGNOSIS — Z11.3 SCREEN FOR STD (SEXUALLY TRANSMITTED DISEASE): Primary | ICD-10-CM

## 2018-08-15 DIAGNOSIS — R87.810 CERVICAL HIGH RISK HPV (HUMAN PAPILLOMAVIRUS) TEST POSITIVE: ICD-10-CM

## 2018-08-15 PROCEDURE — 87491 CHLMYD TRACH DNA AMP PROBE: CPT | Performed by: NURSE PRACTITIONER

## 2018-08-15 PROCEDURE — 87591 N.GONORRHOEAE DNA AMP PROB: CPT | Performed by: NURSE PRACTITIONER

## 2018-08-15 PROCEDURE — 87624 HPV HI-RISK TYP POOLED RSLT: CPT | Performed by: NURSE PRACTITIONER

## 2018-08-15 PROCEDURE — 88175 CYTOPATH C/V AUTO FLUID REDO: CPT | Performed by: NURSE PRACTITIONER

## 2018-08-15 PROCEDURE — 99213 OFFICE O/P EST LOW 20 MIN: CPT | Performed by: NURSE PRACTITIONER

## 2018-08-15 ASSESSMENT — PAIN SCALES - GENERAL: PAINLEVEL: NO PAIN (0)

## 2018-08-15 NOTE — PROGRESS NOTES
SUBJECTIVE:   Becky Moore is a 33 year old female who presents to clinic today for the following health issues:    STD Testing    Patient has no known exposure to a STI recently, but history earlier this year of gonorrhea. Declines serum testing. Denies abnormal pelvic pain, vaginal discharge, itching, odor, abnormal urinary symptoms.   Had a pap smear at [facility] March 2017 that was ASCUS and HR HPV positive, chose to forgo colposcopy and we repeat her pap smear in December, was NIL and HR HPV positive. Again chose to forgo colposcopy, so was due in June for repeat screening. Amenable to completing this today.    Problem list and histories reviewed & adjusted, as indicated.  Additional history: as documented    Patient Active Problem List   Diagnosis     Depression     Borderline personality disorder     CARDIOVASCULAR SCREENING; LDL GOAL LESS THAN 160     Cervical high risk HPV (human papillomavirus) test positive     Major depression in complete remission (H)     Moderate major depression (H)     Generalized anxiety disorder     Panic attack     Nevus     Past Surgical History:   Procedure Laterality Date     CRYOCAUTERY OF CERVIX  06/2015     PE TUBES       SALPINGECTOMY      After [facility]     TONSILLECTOMY         Social History   Substance Use Topics     Smoking status: Never Smoker     Smokeless tobacco: Never Used      Comment: Nonsmoking household     Alcohol use No      Comment: Occasionally     Family History   Problem Relation Age of Onset     Breast Cancer Maternal Grandmother      C.A.D. Maternal Grandmother      HEART DISEASE Maternal Grandmother      Cancer Maternal Grandmother      Hypertension Maternal Grandmother      Thyroid Disease Maternal Grandmother      Cerebrovascular Disease Maternal Grandfather      Diabetes Maternal Grandfather      Hypertension Maternal Grandfather      HEART DISEASE Paternal Grandfather      Asthma Sister      Glaucoma No family hx of      Macular Degeneration No  "family hx of            Reviewed and updated as needed this visit by clinical staff       Reviewed and updated as needed this visit by Provider         ROS:  Constitutional, HEENT, cardiovascular, pulmonary, gi and gu systems are negative, except as otherwise noted.    OBJECTIVE:     /73  Pulse 90  Temp 99.2  F (37.3  C) (Oral)  Ht 5' 3.75\" (1.619 m)  Wt 144 lb 12.8 oz (65.7 kg)  LMP 07/18/2018 (Approximate)  SpO2 97%  BMI 25.05 kg/m2  Body mass index is 25.05 kg/(m^2).  GENERAL: healthy, alert and no distress  RESP: lungs clear to auscultation - no rales, rhonchi or wheezes  CV: regular rate and rhythm, normal S1 S2, no S3 or S4, no murmur, click or rub, no peripheral edema and peripheral pulses strong  ABDOMEN: soft, nontender, no hepatosplenomegaly, no masses and bowel sounds normal   (female): normal female external genitalia, normal urethral meatus, vaginal mucosa, normal cervix/adnexa/uterus without masses or discharge  MS: no gross musculoskeletal defects noted, no edema  SKIN: no suspicious lesions or rashes  PSYCH: mentation appears normal, affect normal/bright    ASSESSMENT/PLAN:   1. Screen for STD (sexually transmitted disease)  Will follow up with patient when results available.  - Chlamydia trachomatis PCR  - Neisseria gonorrhoeae PCR    2. Cervical high risk HPV (human papillomavirus) test positive  Await results and plan follow up as indicated. Patient had a consult for hysterectomy at [facility] due to her ongoing abnormal pap smears and history of endometriosis. That provider was retiring and referred her to a colleague and patient did not follow up. Questions pursuing hysterectomy if pap smear is abnormal and will schedule consult with GYN MD if she would like to further discuss this.  - Pap imaged thin layer diagnostic only  - HPV High Risk Types DNA Cervical    LUIS ARMANDO Ruiz [facility]  "

## 2018-08-15 NOTE — NURSING NOTE
"Chief Complaint   Patient presents with     STD     Testing       Initial /73  Pulse 90  Temp 99.2  F (37.3  C) (Oral)  Ht 5' 3.75\" (1.619 m)  Wt 144 lb 12.8 oz (65.7 kg)  LMP 07/18/2018 (Approximate)  SpO2 97%  BMI 25.05 kg/m2 Estimated body mass index is 25.05 kg/(m^2) as calculated from the following:    Height as of this encounter: 5' 3.75\" (1.619 m).    Weight as of this encounter: 144 lb 12.8 oz (65.7 kg)..  BP completed using cuff size: chela Mcduffie CMA    "

## 2018-08-15 NOTE — MR AVS SNAPSHOT
"              After Visit Summary   8/15/2018    Becky Moore    MRN: [id number]           Patient Information     Date Of Birth          1985        Visit Information        Provider Department      8/15/2018 3:30 PM Brittnee Mcmillan APRN CNP [facility]        Today's Diagnoses     Screen for STD (sexually transmitted disease)    -  1    Cervical high risk HPV (human papillomavirus) test positive           Follow-ups after your visit        Who to contact     If you have questions or need follow up information about today's clinic visit or your schedule please contact [facility] directly at [phone].  Normal or non-critical lab and imaging results will be communicated to you by ChartsNow (now MusicQubed)hart, letter or phone within 4 business days after the clinic has received the results. If you do not hear from us within 7 days, please contact the clinic through ChartsNow (now MusicQubed)hart or phone. If you have a critical or abnormal lab result, we will notify you by phone as soon as possible.  Submit refill requests through Alere or call your pharmacy and they will forward the refill request to us. Please allow 3 business days for your refill to be completed.          Additional Information About Your Visit        MyChart Information     Alere gives you secure access to your electronic health record. If you see a primary care provider, you can also send messages to your care team and make appointments. If you have questions, please call your primary care clinic.  If you do not have a primary care provider, please call [phone] and they will assist you.        Care EveryWhere ID     This is your Care EveryWhere ID. This could be used by other organizations to access your [place] medical records  [id number]        Your Vitals Were     Pulse Temperature Height Last Period Pulse Oximetry BMI (Body Mass Index)    90 99.2  F (37.3  C) (Oral) 5' 3.75\" (1.619 m) 07/18/2018 (Approximate) 97% 25.05 kg/m2 "       Blood Pressure from Last 3 Encounters:   08/15/18 120/73   05/10/18 119/66   03/26/18 113/65    Weight from Last 3 Encounters:   08/15/18 144 lb 12.8 oz (65.7 kg)   05/10/18 142 lb 3.2 oz (64.5 kg)   03/26/18 142 lb 12.8 oz (64.8 kg)              We Performed the Following     Chlamydia trachomatis PCR     HPV High Risk Types DNA Cervical     Neisseria gonorrhoeae PCR     Pap imaged thin layer diagnostic only        Primary Care Provider Office Phone # Fax #    [facility] [phone] [address] [facility] [address]        Equal Access to Services     FERMIN SARMIENTO : Fatimah [place], wauagustin mejia, qajoe kaalmada deepa, shine mcclendon. So [facility] [phone].    ATENCIÓN: Si habla español, tiene a brown disposición servicios gratuitos de asistencia lingüística. Llame al [phone].    We comply with applicable federal civil rights laws and [place] laws. We do not discriminate on the basis of race, color, national origin, age, disability, sex, sexual orientation, or gender identity.            Thank you!     Thank you for choosing [facility]  for your care. Our goal is always to provide you with excellent care. Hearing back from our patients is one way we can continue to improve our services. Please take a few minutes to complete the written survey that you may receive in the mail after your visit with us. Thank you!             Your Updated Medication List - Protect others around you: Learn how to safely use, store and throw away your medicines at www.disposemymeds.org.          This list is accurate as of 8/15/18  3:52 PM.  Always use your most recent med list.                   Brand Name Dispense Instructions for use Diagnosis    Multi-vitamin Tabs tablet      Take 1 tablet by mouth daily        valACYclovir 1000 mg tablet    VALTREX    90 tablet    Take 1 tablet (1,000 mg) by mouth daily    Herpes simplex  virus infection

## 2018-08-17 LAB
COPATH REPORT: NORMAL
PAP: NORMAL

## 2018-08-21 LAB
FINAL DIAGNOSIS: ABNORMAL
HPV HR 12 DNA CVX QL NAA+PROBE: POSITIVE
HPV16 DNA SPEC QL NAA+PROBE: NEGATIVE
HPV18 DNA SPEC QL NAA+PROBE: NEGATIVE
SPECIMEN DESCRIPTION: ABNORMAL
SPECIMEN SOURCE CVX/VAG CYTO: ABNORMAL

## 2019-01-29 DIAGNOSIS — B00.9 HERPES SIMPLEX VIRUS INFECTION: ICD-10-CM

## 2019-01-29 RX ORDER — VALACYCLOVIR HYDROCHLORIDE 1 G/1
TABLET, FILM COATED ORAL
Qty: 90 TABLET | Refills: 2 | Status: SHIPPED | OUTPATIENT
Start: 2019-01-29 | End: 2019-12-06

## 2019-01-29 NOTE — TELEPHONE ENCOUNTER
"Antivirals for Herpes Protocol Failed   valACYclovir (VALTREX) 1000 mg tablet [Pharmacy Med Name: VALACYCLOVIR 1GM TABLETS]   Rerun Protocol (1/29/2019 11:12 AM)      Normal serum creatinine on file in past 12 months      No lab results found.         Patient is age 12 or older         Recent (12 mo) or future (30 days) visit within the authorizing provider's specialty      Patient had office visit in the last 12 months or has a visit in the next 30 days with authorizing provider or within the authorizing provider's specialty.  See \"Patient Info\" tab in inbasket, or \"Choose Columns\" in Meds & Orders section of the refill encounter.              Medication is active on med list        Routing refill request to provider for review/approval because:  Labs not current:  No creatinine on file.    Britney Godoy RN          "

## 2019-02-21 ENCOUNTER — TELEPHONE (OUTPATIENT)
Dept: OBGYN | Facility: CLINIC | Age: 34
End: 2019-02-21

## 2019-02-21 NOTE — TELEPHONE ENCOUNTER
Pt is past due for Pap follow up  Reminder letter has been sent  LMTC her clinic with any questions or to schedule    Gabriela Corona,   Pap Tracking

## 2019-05-03 ENCOUNTER — HEALTH MAINTENANCE LETTER (OUTPATIENT)
Age: 34
End: 2019-05-03

## 2019-11-05 ENCOUNTER — HEALTH MAINTENANCE LETTER (OUTPATIENT)
Age: 34
End: 2019-11-05

## 2019-12-06 DIAGNOSIS — B00.9 HERPES SIMPLEX VIRUS INFECTION: ICD-10-CM

## 2019-12-06 RX ORDER — VALACYCLOVIR HYDROCHLORIDE 1 G/1
1000 TABLET, FILM COATED ORAL DAILY
Qty: 30 TABLET | Refills: 0 | Status: SHIPPED | OUTPATIENT
Start: 2019-12-06 | End: 2020-01-03

## 2019-12-06 NOTE — TELEPHONE ENCOUNTER
"Antivirals for Herpes Protocol Failed   valACYclovir (VALTREX) 1000 mg tablet [Pharmacy Med Name: VALACYCLOVIR 1GM TABLETS]   Rerun Protocol (12/6/2019 7:38 AM)      Recent (12 mo) or future (30 days) visit within the authorizing provider's specialty      Patient has had an office visit with the authorizing provider or a provider within the authorizing providers department within the previous 12 mos or has a future within next 30 days. See \"Patient Info\" tab in inbasket, or \"Choose Columns\" in Meds & Orders section of the refill encounter.              Normal serum creatinine on file in past 12 months      No lab results found.         Patient is age 12 or older         Medication is active on med list        Routing refill request to provider for review/approval because:  Labs not current:  creatinine  Patient needs to be seen because it has been more than 1 year since last office visit.  Pt's last OV was on 8/15/18.    Britney Godoy RN          "

## 2019-12-06 NOTE — TELEPHONE ENCOUNTER
Called and left message that refill was done for 30 days and to set up appt before next refill   Ibeth Constantino M.A.

## 2020-01-03 ENCOUNTER — RESULT FOLLOW UP (OUTPATIENT)
Dept: OBGYN | Facility: CLINIC | Age: 35
End: 2020-01-03

## 2020-01-03 ENCOUNTER — OFFICE VISIT (OUTPATIENT)
Dept: OBGYN | Facility: CLINIC | Age: 35
End: 2020-01-03
Payer: COMMERCIAL

## 2020-01-03 VITALS
DIASTOLIC BLOOD PRESSURE: 65 MMHG | HEART RATE: 62 BPM | BODY MASS INDEX: 20.83 KG/M2 | WEIGHT: 122 LBS | TEMPERATURE: 97.9 F | SYSTOLIC BLOOD PRESSURE: 107 MMHG | OXYGEN SATURATION: 99 % | HEIGHT: 64 IN

## 2020-01-03 DIAGNOSIS — Z01.419 ENCOUNTER FOR GYNECOLOGICAL EXAMINATION WITHOUT ABNORMAL FINDING: Primary | ICD-10-CM

## 2020-01-03 DIAGNOSIS — R87.810 CERVICAL HIGH RISK HPV (HUMAN PAPILLOMAVIRUS) TEST POSITIVE: ICD-10-CM

## 2020-01-03 DIAGNOSIS — R87.610 ASCUS WITH POSITIVE HIGH RISK HPV CERVICAL: ICD-10-CM

## 2020-01-03 DIAGNOSIS — R87.810 ASCUS WITH POSITIVE HIGH RISK HPV CERVICAL: ICD-10-CM

## 2020-01-03 DIAGNOSIS — B00.9 HERPES SIMPLEX VIRUS INFECTION: ICD-10-CM

## 2020-01-03 PROCEDURE — 87624 HPV HI-RISK TYP POOLED RSLT: CPT | Performed by: NURSE PRACTITIONER

## 2020-01-03 PROCEDURE — G0476 HPV COMBO ASSAY CA SCREEN: HCPCS | Performed by: NURSE PRACTITIONER

## 2020-01-03 PROCEDURE — 99395 PREV VISIT EST AGE 18-39: CPT | Performed by: NURSE PRACTITIONER

## 2020-01-03 PROCEDURE — 88175 CYTOPATH C/V AUTO FLUID REDO: CPT | Performed by: NURSE PRACTITIONER

## 2020-01-03 PROCEDURE — 88141 CYTOPATH C/V INTERPRET: CPT | Performed by: NURSE PRACTITIONER

## 2020-01-03 RX ORDER — VALACYCLOVIR HYDROCHLORIDE 1 G/1
1000 TABLET, FILM COATED ORAL DAILY
Qty: 90 TABLET | Refills: 3 | Status: SHIPPED | OUTPATIENT
Start: 2020-01-03

## 2020-01-03 ASSESSMENT — MIFFLIN-ST. JEOR: SCORE: 1244.42

## 2020-01-03 ASSESSMENT — PAIN SCALES - GENERAL: PAINLEVEL: NO PAIN (0)

## 2020-01-03 NOTE — LETTER
March 3, 2020      Becky Moore  [address]  [place] [address]    Dear Teresa,      At [facility], your health and wellness is our primary concern. That is why we are following up on an abnormal pap from 1/3/20, which was reported as ASCUS and positive for high risk HPV. Your provider had recommended that you have a Colposcopy  completed by 4/3/20. Our records show that you may be declining the Colposcopy and instead would like to have a Hysterectomy but neither of these have been scheduled.    It is important to complete the follow up that your provider has suggested for you to ensure that there are no worsening changes which may, over time, develop into cancer.      Please contact our office at  [phone] to schedule an appointment for a Hysterectomy or a Colposcopy (this cannot be scheduled through [employer]). If you have questions or concerns, please call the clinic and we will be happy to assist you.    If you have completed the tests outside of [facility], please have the results forwarded to our office. We will update the chart for your primary Physician to review before your next annual physical.     Thank you for choosing [facility]!    Sincerely,      Your [facility] Care Team/yi

## 2020-01-03 NOTE — PROGRESS NOTES
SUBJECTIVE:   CC: Becky Moore is an 34 year old woman who presents for preventive health visit.     Healthy Habits:     Getting at least 3 servings of Calcium per day:  Yes    Bi-annual eye exam:  Yes    Dental care twice a year:  Yes    Sleep apnea or symptoms of sleep apnea:  None    Diet:  Vegetarian/vegan and Gluten-free/reduced    Frequency of exercise:  6-7 days/week    Duration of exercise:  45-60 minutes    Taking medications regularly:  Yes    Medication side effects:  None    PHQ-2 Total Score: 0    Additional concerns today:  No      Today's PHQ-2 Score:   PHQ-2 ( 1999 Pfizer) 1/3/2020   Q1: Little interest or pleasure in doing things 0   Q2: Feeling down, depressed or hopeless 0   PHQ-2 Score 0   Q1: Little interest or pleasure in doing things Not at all   Q2: Feeling down, depressed or hopeless Not at all   PHQ-2 Score 0       Abuse: Current or Past(Physical, Sexual or Emotional)- No  Do you feel safe in your environment? Yes        Social History     Tobacco Use     Smoking status: Never Smoker     Smokeless tobacco: Never Used     Tobacco comment: Nonsmoking household   Substance Use Topics     Alcohol use: No     Comment: Occasionally         Alcohol Use 1/3/2020   Prescreen: >3 drinks/day or >7 drinks/week? No   No flowsheet data found.    Reviewed orders with patient.  Reviewed health maintenance and updated orders accordingly - Yes  Patient Active Problem List   Diagnosis     Depression     Borderline personality disorder (H)     CARDIOVASCULAR SCREENING; LDL GOAL LESS THAN 160     Cervical high risk HPV (human papillomavirus) test positive     Major depression in complete remission (H)     Moderate major depression (H)     Generalized anxiety disorder     Panic attack     Nevus     Past Surgical History:   Procedure Laterality Date     CRYOCAUTERY OF CERVIX  06/2015     PE TUBES       SALPINGECTOMY      After [facility]     TONSILLECTOMY         Social History     Tobacco Use     Smoking  status: Never Smoker     Smokeless tobacco: Never Used     Tobacco comment: Nonsmoking household   Substance Use Topics     Alcohol use: No     Comment: Occasionally     Family History   Problem Relation Age of Onset     Breast Cancer Maternal Grandmother      C.A.D. Maternal Grandmother      Heart Disease Maternal Grandmother      Cancer Maternal Grandmother      Hypertension Maternal Grandmother      Thyroid Disease Maternal Grandmother      Cerebrovascular Disease Maternal Grandfather      Diabetes Maternal Grandfather      Hypertension Maternal Grandfather      Heart Disease Paternal Grandfather      Asthma Sister      Glaucoma No family hx of      Macular Degeneration No family hx of            Mammogram not appropriate for this patient based on age.    Pertinent mammograms are reviewed under the imaging tab.  History of abnormal Pap smear: YES - updated in Problem List and Health Maintenance accordingly  PAP / HPV Latest Ref Rng & Units 8/15/2018 12/20/2017 8/13/2013   PAP - NIL NIL ASC-US(A)   HPV 16 DNA NEG:Negative Negative Negative -   HPV 18 DNA NEG:Negative Negative Negative -   OTHER HR HPV NEG:Negative Positive(A) Positive(A) -     Reviewed and updated as needed this visit by clinical staff  Tobacco  Allergies  Meds  Med Hx  Surg Hx  Fam Hx  Soc Hx        Reviewed and updated as needed this visit by Provider        Past Medical History:   Diagnosis Date     Arthritis      ASCUS with positive high risk HPV 8/2013    type 16     Biological false-positive (BFP) syphilis serology test 2/22/2010     Borderline personality disorder (H)      KATINA III (cervical intraepithelial neoplasia III) 06/2015    KATINA 2-3     Depression      Endometriosis      History of colposcopy with cervical biopsy 10/6/10    Negative for dysplasia      Past Surgical History:   Procedure Laterality Date     CRYOCAUTERY OF CERVIX  06/2015     PE TUBES       SALPINGECTOMY      After [facility]     TONSILLECTOMY         Review of  "Systems  CONSTITUTIONAL: NEGATIVE for fever, chills, change in weight  INTEGUMENTARU/SKIN: NEGATIVE for worrisome rashes, moles or lesions  EYES: NEGATIVE for vision changes or irritation  ENT: NEGATIVE for ear, mouth and throat problems  RESP: NEGATIVE for significant cough or SOB  BREAST: NEGATIVE for masses, tenderness or discharge  CV: NEGATIVE for chest pain, palpitations or peripheral edema  GI: NEGATIVE for nausea, abdominal pain, heartburn, or change in bowel habits  : NEGATIVE for unusual urinary or vaginal symptoms. Periods are regular.  MUSCULOSKELETAL: NEGATIVE for significant arthralgias or myalgia  NEURO: NEGATIVE for weakness, dizziness or paresthesias  PSYCHIATRIC: NEGATIVE for changes in mood or affect     OBJECTIVE:   /65 (BP Location: Right arm, Patient Position: Sitting, Cuff Size: Adult Regular)   Pulse 62   Temp 97.9  F (36.6  C) (Oral)   Ht 1.619 m (5' 3.75\")   Wt 55.3 kg (122 lb)   LMP 12/22/2019 (Approximate)   SpO2 99%   BMI 21.11 kg/m    Physical Exam  GENERAL: healthy, alert and no distress  EYES: Eyes grossly normal to inspection, PERRL and conjunctivae and sclerae normal  HENT: ear canals and TM's normal, nose and mouth without ulcers or lesions  NECK: no adenopathy, no asymmetry, masses, or scars and thyroid normal to palpation  RESP: lungs clear to auscultation - no rales, rhonchi or wheezes  BREAST: normal without masses, tenderness or nipple discharge and no palpable axillary masses or adenopathy  CV: regular rate and rhythm, normal S1 S2, no S3 or S4, no murmur, click or rub, no peripheral edema and peripheral pulses strong  ABDOMEN: soft, nontender, no hepatosplenomegaly, no masses and bowel sounds normal   (female): normal female external genitalia, normal urethral meatus, vaginal mucosa pink, moist, well rugated, and normal cervix/adnexa/uterus without masses or discharge  MS: no gross musculoskeletal defects noted, no edema  SKIN: no suspicious lesions or " "rashes  NEURO: Normal strength and tone, mentation intact and speech normal  PSYCH: mentation appears normal, affect normal/bright    ASSESSMENT/PLAN:   1. Encounter for gynecological examination without abnormal finding  Using Salpingectomy for contraception    2. Herpes simplex virus infection  - valACYclovir (VALTREX) 1000 mg tablet; Take 1 tablet (1,000 mg) by mouth daily  Dispense: 90 tablet; Refill: 3    3. Cervical high risk HPV (human papillomavirus) test positive  Follow up based on results, has declined colposcopy the last few years.  - Pap imaged thin layer diagnostic only  - HPV High Risk Types DNA Cervical    COUNSELING:  Reviewed preventive health counseling, as reflected in patient instructions  Special attention given to:        Regular exercise       Healthy diet/nutrition       Safe sex practices/STD prevention    Estimated body mass index is 21.11 kg/m  as calculated from the following:    Height as of this encounter: 1.619 m (5' 3.75\").    Weight as of this encounter: 55.3 kg (122 lb).         reports that she has never smoked. She has never used smokeless tobacco.      Counseling Resources:  ATP IV Guidelines  Pooled Cohorts Equation Calculator  Breast Cancer Risk Calculator  FRAX Risk Assessment  ICSI Preventive Guidelines  Dietary Guidelines for Americans, 2010  USDA's MyPlate  ASA Prophylaxis  Lung CA Screening    LUIS ARMANDO Ruiz [facility]  "

## 2020-01-03 NOTE — LETTER
June 19, 2020       Bceky Moore  [address]  [place] [address]    Dear Teresa,    At [facility], your health and wellness is our primary concern. That is why we are following up on an abnormal pap from 1/3/20, which was reported as ASCUS and positive for high risk HPV. Your provider had recommended that you have a Colposcopy completed by 4/3/20.     COVID-19 scheduling restrictions have been revised and we are now able to make this appointment at limited clinic sites:    [place]  [phone]    [place] [phone]   [facility] ([place]) [phone]   [place] [phone] ([facility] for women's clinic)   [place] [phone]   [place] [phone]   [place] [phone]     It is important to complete the follow up that your provider has suggested for you to ensure that there are no worsening changes which may, over time, develop into cancer.      Please contact your preferred clinic from the list above to schedule an appointment for a Colposcopy (this cannot be scheduled through [facility]) at your earliest convenience.     If you have chosen not to do the recommended colposcopy, please contact your clinic to schedule an appointment for a repeat PAP smear and HPV test at this time. If you have questions or concerns, please call the clinic and we will be happy to assist you.    If you have completed the tests outside of [facility], please have the results forwarded to our office. We will update the chart for your primary Physician to review before your next annual physical.     Thank you for choosing [facility]!    Sincerely,  Your [facility] Care Team//[facility]

## 2020-01-09 LAB
COPATH REPORT: ABNORMAL
PAP: ABNORMAL

## 2020-01-10 NOTE — PROGRESS NOTES
10/7/09:Pap--ASCUS, + high risk HPV 70.   6/10/10:Pap--LSIL, can't exclude HSIL.   10/6/10:[facility]--Negative for dysplasia.   11/1/11:Pap--NIL. + high risk HPV 16. Repeat pap 6 months.   4/23/12:Pap--NIL. (done at [facility])  12/7/12:Pap--ASC-H. (done at [facility])  12/13/12:[facility]--negative (done at [facility])   5/29/13: Patient non-compliant/declines recommended follow-up. Pap tracking file closed.   8/13/13:Pap - ASCUS, + HR HPV 16. Repeat pap and HPV in 6 months.    4/8/2014 NIL Pap (Care Everywhere)  5/27/15 LSIL Pap, can't exclude HSIL Pap (Care Everywhere)  6/8/15 [facility] - KATINA 2-3. (Care Everywhere)  6/2015 - Cryotherapy (per visit note documentation)  12/7/15 NIL Pap (Care Everywhere)  3/7/17 ASCUS Pap, + HR HPV (Care Everywhere).Colposcopy was recommended-patient did not do this.  12/20/17 NIL Pap, + HR HPV (Neg 16/18). Pt declines colposcopy. Plan cotest in 6 months.   8/15/18 NIL Pap, + HR HPV (Neg 16/18). Pt declines a colposcopy. Plan cotest in 6 months.   03/25/19 Patient is lost to pap tracking follow-up.   1/3/2020 ASCUS pap with + HR HPV (not 16 or 18). Plan colp. (MRA)  1/13/20 Message left to return call. (yonathan) Pt declining colposcopy. Would like a hysterectomy. (yonathan)  3/3/20 [facility] reminder letter sent (rlm)  4/8/20 Per provider, COVID 19 interim guideline, plan updated to: colp before 7/3/20. (yonathan)     06/19/20 Resume reminder process, due to lifting of COVID-19 scheduling restrictions. 6mo colp reminder letter sent. Result Follow Up Encounter closed, now tracking in Cervical Cytology Tracker. (es)

## 2020-03-31 ENCOUNTER — PATIENT OUTREACH (OUTPATIENT)
Dept: PEDIATRICS | Facility: CLINIC | Age: 35
End: 2020-03-31

## 2020-03-31 DIAGNOSIS — R87.610 ASCUS WITH POSITIVE HIGH RISK HPV CERVICAL: ICD-10-CM

## 2020-03-31 DIAGNOSIS — R87.810 ASCUS WITH POSITIVE HIGH RISK HPV CERVICAL: ICD-10-CM

## 2020-03-31 NOTE — TELEPHONE ENCOUNTER
Colposcopy reminder letter sent     Gabriela Corona -   [facility] Pap Tracking

## 2020-04-08 ENCOUNTER — PATIENT OUTREACH (OUTPATIENT)
Dept: PEDIATRICS | Facility: CLINIC | Age: 35
End: 2020-04-08

## 2020-04-08 DIAGNOSIS — R87.810 ASCUS WITH POSITIVE HIGH RISK HPV CERVICAL: ICD-10-CM

## 2020-04-08 DIAGNOSIS — R87.610 ASCUS WITH POSITIVE HIGH RISK HPV CERVICAL: ICD-10-CM

## 2020-04-08 NOTE — TELEPHONE ENCOUNTER
Jb Hickman  With the COVID 19 issues, I would hope she might be able to be scheduled by July for the colpo.   Thanks  Geraldo Jimenez MD

## 2020-04-08 NOTE — TELEPHONE ENCOUNTER
Clemente Beaulieu,    Pt previously declined a colposcopy due to wanting a hysterectomy (unless she had this completed outside of [facility]). At this point neither of them have been done. Due to COVID-19 when do you recommend her colposcopy be completed? Pap history below.     Would you recommend colp be completed within 3-6 months, 6-12 months from date of last pap, which was done on 1/3/20?   Resource: [facility] COVID 19 Interim guidelines, https://www.asccp.org/covid-19.    10/7/09:Pap--ASCUS, + high risk HPV 70.   6/10/10:Pap--LSIL, can't exclude HSIL.   10/6/10:[facility]--Negative for dysplasia.   11/1/11:Pap--NIL. + high risk HPV 16. Repeat pap 6 months.   4/23/12:Pap--NIL. (done at [facility])  12/7/12:Pap--ASC-H. (done at [facility])  12/13/12:[facility]--negative (done at [facility])   5/29/13: Patient non-compliant/declines recommended follow-up. Pap tracking file closed.   8/13/13:Pap - ASCUS, + HR HPV 16. Repeat pap and HPV in 6 months.    4/8/2014 NIL Pap (Care Everywhere)  5/27/15 LSIL Pap, can't exclude HSIL Pap (Care Everywhere)  6/8/15 [facility] - KATINA 2-3. (Care Everywhere)  6/2015 - Cryotherapy (per visit note documentation)  12/7/15 NIL Pap (Care Everywhere)  3/7/17 ASCUS Pap, + HR HPV (Care Everywhere).Colposcopy was recommended-patient did not do this.  12/20/17 NIL Pap, + HR HPV (Neg 16/18). Pt declines colposcopy. Plan cotest in 6 months.   8/15/18 NIL Pap, + HR HPV (Neg 16/18). Pt declines a colposcopy. Plan cotest in 6 months.   03/25/19 Patient is lost to pap tracking follow-up.   1/3/2020 ASCUS pap with + HR HPV (not 16 or 18). Plan colp. Pt declines colposcopy. Would like a hysterectomy. Pt looking for an OB/GYN provider of her choice.

## 2020-07-21 ENCOUNTER — PATIENT OUTREACH (OUTPATIENT)
Dept: PEDIATRICS | Facility: CLINIC | Age: 35
End: 2020-07-21

## 2020-07-21 DIAGNOSIS — R87.610 ASCUS WITH POSITIVE HIGH RISK HPV CERVICAL: ICD-10-CM

## 2020-07-21 DIAGNOSIS — R87.810 ASCUS WITH POSITIVE HIGH RISK HPV CERVICAL: ICD-10-CM

## 2020-07-21 NOTE — TELEPHONE ENCOUNTER
[facility] clinic and schedule.    Madeleine Berger -   [facility] Pap Tracking

## 2020-11-22 ENCOUNTER — HEALTH MAINTENANCE LETTER (OUTPATIENT)
Age: 35
End: 2020-11-22

## 2021-02-01 DIAGNOSIS — B00.9 HERPES SIMPLEX VIRUS INFECTION: ICD-10-CM

## 2021-02-01 RX ORDER — VALACYCLOVIR HYDROCHLORIDE 1 G/1
1000 TABLET, FILM COATED ORAL DAILY
Qty: 90 TABLET | Refills: 3 | Status: CANCELLED | OUTPATIENT
Start: 2021-02-01

## 2021-02-02 NOTE — TELEPHONE ENCOUNTER
"Requested Prescriptions   Pending Prescriptions Disp Refills     valACYclovir (VALTREX) 1000 mg tablet 90 tablet 3     Sig: Take 1 tablet (1,000 mg) by mouth daily       Antivirals for Herpes Protocol Failed - 2/1/2021  6:21 PM        Failed - Recent (12 mo) or future (30 days) visit within the authorizing provider's specialty     Patient has had an office visit with the authorizing provider or a provider within the authorizing providers department within the previous 12 mos or has a future within next 30 days. See \"Patient Info\" tab in inbasket, or \"Choose Columns\" in Meds & Orders section of the refill encounter.              Failed - Normal serum creatinine on file in past 12 months     No lab results found.    Ok to refill medication if creatinine is low          Passed - Patient is age 12 or older        Passed - Medication is active on med list           Pt was last seen in clinic on 1/3/2020.    Will send pt a message to schedule an appt.    Britney Godoy RN    "

## 2021-02-13 ENCOUNTER — HEALTH MAINTENANCE LETTER (OUTPATIENT)
Age: 36
End: 2021-02-13

## 2021-02-19 NOTE — TELEPHONE ENCOUNTER
Pt has not read the [employer] message sent to her to remind her to schedule.  Called pt to assist her in scheduling an appt with Brittnee prior to approving a refill.  Unable to reach patient via phone. Left message to call clinic back at [phone].    Britney Godoy RN

## 2021-02-22 NOTE — TELEPHONE ENCOUNTER
Pt states she didn't request a refill and refuses to be seen for an annual exam.    RN reinforced it is important to be seen yearly to be sure medication is still appropriate. Pt refuses.    RN closing encounter and refill.    Renata Aleman RN on 2/22/2021 at 10:11 AM

## 2022-03-05 ENCOUNTER — HEALTH MAINTENANCE LETTER (OUTPATIENT)
Age: 37
End: 2022-03-05

## 2023-04-15 ENCOUNTER — HEALTH MAINTENANCE LETTER (OUTPATIENT)
Age: 38
End: 2023-04-15